# Patient Record
Sex: FEMALE | Race: BLACK OR AFRICAN AMERICAN | Employment: FULL TIME | URBAN - METROPOLITAN AREA
[De-identification: names, ages, dates, MRNs, and addresses within clinical notes are randomized per-mention and may not be internally consistent; named-entity substitution may affect disease eponyms.]

---

## 2022-02-09 ENCOUNTER — TRANSCRIBE ORDER (OUTPATIENT)
Dept: SCHEDULING | Age: 32
End: 2022-02-09

## 2022-02-09 DIAGNOSIS — J32.4 PANSINUSITIS: Primary | ICD-10-CM

## 2022-02-15 ENCOUNTER — HOSPITAL ENCOUNTER (OUTPATIENT)
Dept: CT IMAGING | Age: 32
Discharge: HOME | End: 2022-02-15
Attending: OTOLARYNGOLOGY
Payer: COMMERCIAL

## 2022-02-15 DIAGNOSIS — J32.4 PANSINUSITIS: ICD-10-CM

## 2022-02-15 PROCEDURE — 70486 CT MAXILLOFACIAL W/O DYE: CPT

## 2023-04-06 ENCOUNTER — HOSPITAL ENCOUNTER (OUTPATIENT)
Dept: WOUND CARE | Age: 33
End: 2023-04-06
Payer: COMMERCIAL

## 2023-04-06 PROBLEM — S21.002A OPEN WOUND OF LEFT BREAST WITH COMPLICATION: Status: ACTIVE | Noted: 2023-04-06

## 2023-04-06 PROCEDURE — 11042 DBRDMT SUBQ TIS 1ST 20SQCM/<: CPT

## 2023-04-06 PROCEDURE — 99203 OFFICE O/P NEW LOW 30 MIN: CPT

## 2023-04-06 NOTE — DISCHARGE INSTRUCTIONS
Discharge Instructions/Wound Orders  [place] [address] 1200 [facility], [facility] [address]  Telephone: [phone]    NAME:  Kyree Huggins  YOB: 1990  MEDICAL RECORD NUMBER:  [id number]  DATE:  April 6, 2023  Wound Care Orders:  Left Breast wound - Cleanse with baby shampoo. Rinse and pat dry. Apply iodosorb gel to wound bed. Cover with gauze and tape. Change every other day or more often if needed. Dietary:  [] Diet as tolerated: [] Calorie Diabetic Diet:Low carb and no Sugar [] No Added Salt:[x] Increase Protein: [] Other:Limit the amount of liquid you are drinking and avoid drinking in between meals   Activity:  [x] Activity as tolerated:  [] Patient has no activity restrictions     [] Strict Bedrest: [] Remain off Work:     [] May return to full duty work:                                   [] Return to work with restrictions:   Return Appointment:  [x] Return Appointment: With Dr. Jared Vallejo in 2 [facility])  [] Ordered tests:    Electronically signed Katherin Albert RN on 4/6/2023 at 10:32 AM     Shira Kwan 281: Should you experience any significant changes in your wound(s) or have questions about your wound care, please contact the [address] [place] [address] at [address] [place] [address] 8:00 am - 4:30. If you need help with your wound outside these hours and cannot wait until we are again available, contact your PCP or go to the hospital emergency room. PLEASE NOTE: IF YOU ARE UNABLE TO OBTAIN WOUND SUPPLIES, CONTINUE TO USE THE SUPPLIES YOU HAVE AVAILABLE UNTIL YOU ARE ABLE TO REACH US. IT IS MOST IMPORTANT TO KEEP THE WOUND COVERED AT ALL TIMES.      Physician Signature:_______________________    Date: ___________ Time:  ____________

## 2023-04-06 NOTE — CONSULTS
[address] [facility]     Subjective:      Abdon Medeiros is a 34 y.o. female who presents for evaluation of left upper inner breast wound. Patient's history notable for seeing a dermatologist a few weeks ago for some mild erythema of the breast skin and apparently underwent a biopsy that sounds like a punch biopsy with primary closure that by patient's report was benign. There is no personal or family history of breast cancer other than in an uncle, patient has regular menstrual cycles has never been pregnant, no nipple discharge, has not had any mammograms or ultrasound. Patient reassures me the biopsy was benign. Apparently the wound had been closed primarily by dermatology but subsequently developed an infection and cellulitis, wound was treated with Keflex. Apparently the patient was told to come back in 4 weeks and it sounds like she may have lost daylin in the dermatologist so that she scheduled an appointment in the wound clinic. Patient states the erythema which she had marked several days ago has now regressed and only minimal pain. 04/06/23 1010   Wound Breast Left #1  left breast. 04/06/23   Date First Assessed/Time First Assessed: 04/06/23 1009   Present on Hospital Admission: Yes  Wound Approximate Age at First Assessment (Weeks): 3 weeks  Primary Wound Type: Skin Tear  Location: Breast  Wound Location Orientation: Left  Wound Descripti. .. Wound Image    Wound Etiology Skin Tear   Dressing Status Clean; Old drainage noted   Cleansed Cleansed with saline   Wound Length (cm) 2.3 cm   Wound Width (cm) 1.8 cm   Wound Depth (cm) 0.1 cm   Wound Surface Area (cm^2) 4.14 cm^2   Wound Volume (cm^3) 0.414 cm^3   Wound Assessment [place]/red;Slough   Drainage Amount Small   Drainage Description Serosanguinous   Wound Odor None   Missy-Wound/Incision Assessment Blanchable erythema   Edges Flat/open edges   Wound Thickness Description Partial thickness      Visit Vitals  BP (!) 145/89 (BP 1 Location: Left upper arm, BP Patient Position: At rest;Sitting)   Pulse 86   Temp 97.5 °F (36.4 °C)   Resp 18                     No results found for this or any previous visit (from the past 48 hour(s)). XR Results (maximum last 3): No results found for this or any previous visit. CT Results (maximum last 3): Results from [facility] encounter on 02/15/22    CT SINUSES WO CONT    Impression  1. Clear paranasal sinuses. 2. Mild rightward deviation of the nasal septum. MRI Results (maximum last 3): No results found for this or any previous visit. Nuclear Medicine Results (maximum last 3): No results found for this or any previous visit. US Results (maximum last 3): No results found for this or any previous visit. History reviewed. No pertinent past medical history. History reviewed. No pertinent surgical history. History reviewed. No pertinent family history. Social History     Tobacco Use    Smoking status: Never    Smokeless tobacco: Never   Substance Use Topics    Alcohol use: Never      Prior to Admission medications    Not on File      Not on File    Review of Systems   Constitutional:         Left breast open wound no nipple discharge or other breast masses   All other systems reviewed and are negative. Objective:     Patient Vitals for the past 8 hrs:   BP Temp Pulse Resp   23 1003 (!) 145/89 97.5 °F (36.4 °C) 86 18       Temp (24hrs), Av.5 °F (36.4 °C), Min:97.5 °F (36.4 °C), Max:97.5 °F (36.4 °C)      Physical Exam  Vitals and nursing note reviewed. Exam conducted with a chaperone present (Nursing). Constitutional:       General: She is not in acute distress. Appearance: Normal appearance. She is not ill-appearing. HENT:      Head: Normocephalic. Nose: Nose normal.      Mouth/Throat:      Mouth: Mucous membranes are dry. Eyes:      Conjunctiva/sclera: Conjunctivae normal.   Cardiovascular:      Rate and Rhythm: Normal rate. Pulmonary:      Effort: Pulmonary effort is normal.   Chest:      Comments: Bilateral breast and axilla with no axillary adenopathy, no concerning breast masses no nipple discharge, right breast skin intact with no concerns or erythema or skin changes or nodules, left breast with open wound as described and pictured above, with open wound 1.8 x 2.3 cm. Superficial necrosis evident in the skin and superficial subcutaneous tissue with surrounding skin epithelialization  Abdominal:      General: Abdomen is flat. Palpations: Abdomen is soft. Musculoskeletal:         General: Normal range of motion. Skin:     General: Skin is warm and dry. Neurological:      General: No focal deficit present. Mental Status: She is alert and oriented to person, place, and time. Psychiatric:         Mood and Affect: Mood normal.         Behavior: Behavior normal.         Thought Content: Thought content normal.         Judgment: Judgment normal.       Assessment:     Active Problems:    Open wound of left breast with complication (2/2/2121)        Plan:     Recommended excisional surgical sharp debridement of the necrotic wound tissue noted, benefits risks alternatives reviewed with patient and she concurred,    Procedure: Using sharp circular curette, sharp surgical excisional debridement over 1.8 cm x 2.3 cm area down to granulating healthy superficial subcutaneous tissue for debridement of the skin and superficial subcutaneous tissue necrosis back to healthy bleeding tissue with a skin bridge between the 2 areas. Patient tolerated procedure well with no discomfort. She had lidocaine jelly applied to the area previously. Wound care with will be with Iodosorb gauze or pad dressing she can insert in the bra to hopefully avoid any adhesive tape shearing or dermatitis, apply twice a day after shower cleansing with soap and water.   Wound may take several weeks to couple months to entirely epithelialize, no evidence of deep-seated abscess or cellulitis,    Mammograms bilaterally at age 41 or sooner if recommended by PCP, no need for concern for malignancy at this point. Follow-up in 2 weeks, sooner if needed and if healing well patient can cancel appointment if she is satisfied.     FACE TO FACE time including review of any indicated imaging, discussion with patient, and other providers, exam and discussion with patient:   37          Minutes    Signed By: Aruna Newman MD   [facility]    April 6, 2023

## 2023-04-06 NOTE — WOUND CARE
04/06/23 1010   Wound Breast Left #1  left breast. 04/06/23   Date First Assessed/Time First Assessed: 04/06/23 1009   Present on Hospital Admission: Yes  Wound Approximate Age at First Assessment (Weeks): 3 weeks  Primary Wound Type: Skin Tear  Location: Breast  Wound Location Orientation: Left  Wound Descripti. .. Wound Image    Wound Etiology Skin Tear   Dressing Status Clean; Old drainage noted   Cleansed Cleansed with saline   Wound Length (cm) 2.3 cm   Wound Width (cm) 1.8 cm   Wound Depth (cm) 0.1 cm   Wound Surface Area (cm^2) 4.14 cm^2   Wound Volume (cm^3) 0.414 cm^3   Wound Assessment [place]/red;Slough   Drainage Amount Small   Drainage Description Serosanguinous   Wound Odor None   Missy-Wound/Incision Assessment Blanchable erythema   Edges Flat/open edges   Wound Thickness Description Partial thickness     Visit Vitals  BP (!) 145/89 (BP 1 Location: Left upper arm, BP Patient Position: At rest;Sitting)   Pulse 86   Temp 97.5 °F (36.4 °C)   Resp 18

## 2023-04-20 ENCOUNTER — HOSPITAL ENCOUNTER (OUTPATIENT)
Dept: WOUND CARE | Age: 33
Discharge: HOME | End: 2023-04-20
Payer: COMMERCIAL

## 2023-04-20 VITALS
DIASTOLIC BLOOD PRESSURE: 82 MMHG | TEMPERATURE: 97.9 F | HEART RATE: 60 BPM | SYSTOLIC BLOOD PRESSURE: 134 MMHG | RESPIRATION RATE: 18 BRPM

## 2023-04-20 DIAGNOSIS — S21.002D OPEN WOUND OF LEFT BREAST WITH COMPLICATION, SUBSEQUENT ENCOUNTER: Primary | ICD-10-CM

## 2023-04-20 DIAGNOSIS — S21.002A: ICD-10-CM

## 2023-04-20 PROCEDURE — 11042 DBRDMT SUBQ TIS 1ST 20SQCM/<: CPT

## 2023-04-20 RX ORDER — BACITRACIN ZINC AND POLYMYXIN B SULFATE 500; 1000 [USP'U]/G; [USP'U]/G
OINTMENT TOPICAL ONCE
OUTPATIENT
Start: 2023-04-20 | End: 2023-04-20

## 2023-04-20 RX ORDER — MUPIROCIN 20 MG/G
OINTMENT TOPICAL ONCE
OUTPATIENT
Start: 2023-04-20 | End: 2023-04-20

## 2023-04-20 RX ORDER — BETAMETHASONE DIPROPIONATE 0.5 MG/G
OINTMENT TOPICAL ONCE
OUTPATIENT
Start: 2023-04-20 | End: 2023-04-20

## 2023-04-20 RX ORDER — GENTAMICIN SULFATE 1 MG/G
OINTMENT TOPICAL ONCE
OUTPATIENT
Start: 2023-04-20 | End: 2023-04-20

## 2023-04-20 RX ORDER — BACITRACIN 500 [USP'U]/G
OINTMENT TOPICAL ONCE
OUTPATIENT
Start: 2023-04-20 | End: 2023-04-20

## 2023-04-20 RX ORDER — LIDOCAINE 40 MG/G
CREAM TOPICAL ONCE
OUTPATIENT
Start: 2023-04-20 | End: 2023-04-20

## 2023-04-20 RX ORDER — SILVER SULFADIAZINE 10 G/1000G
CREAM TOPICAL ONCE
OUTPATIENT
Start: 2023-04-20 | End: 2023-04-20

## 2023-04-20 RX ORDER — CLOBETASOL PROPIONATE 0.5 MG/G
OINTMENT TOPICAL ONCE
OUTPATIENT
Start: 2023-04-20 | End: 2023-04-20

## 2023-04-20 RX ORDER — LIDOCAINE HYDROCHLORIDE 20 MG/ML
JELLY TOPICAL ONCE
OUTPATIENT
Start: 2023-04-20 | End: 2023-04-20

## 2023-04-20 RX ORDER — LIDOCAINE 50 MG/G
OINTMENT TOPICAL ONCE
OUTPATIENT
Start: 2023-04-20 | End: 2023-04-20

## 2023-04-20 RX ORDER — LIDOCAINE HYDROCHLORIDE 40 MG/ML
SOLUTION TOPICAL ONCE
OUTPATIENT
Start: 2023-04-20 | End: 2023-04-20

## 2023-04-20 RX ORDER — TRIAMCINOLONE ACETONIDE 1 MG/G
OINTMENT TOPICAL ONCE
OUTPATIENT
Start: 2023-04-20 | End: 2023-04-20

## 2023-04-20 NOTE — WOUND CARE
04/20/23 1024   Anesthetic   Anesthetic 4% Lidocaine Liquid Topical   Wound Breast Left #1  left breast. 04/06/23   Date First Assessed/Time First Assessed: 04/06/23 1009   Present on Hospital Admission: Yes  Wound Approximate Age at First Assessment (Weeks): 3 weeks  Primary Wound Type: Skin Tear  Location: Breast  Wound Location Orientation: Left  Wound Descripti. ..    Wound Image    Cleansed Cleansed with saline   Wound Length (cm) 2 cm   Wound Width (cm) 1.6 cm   Wound Depth (cm) 0 cm   Wound Surface Area (cm^2) 3.2 cm^2   Change in Wound Size % 22.71   Wound Volume (cm^3) 0 cm^3   Wound Healing % 100   Wound Assessment Dry;Slough   Drainage Amount Small   Drainage Description Serosanguinous   Wound Odor None   Missy-Wound/Incision Assessment Intact   Edges Attached edges     Visit Vitals  /82 (BP 1 Location: Left upper arm, BP Patient Position: Reclining)   Pulse 60   Temp 97.9 °F (36.6 °C)   Resp 18

## 2023-04-20 NOTE — DISCHARGE INSTRUCTIONS
Discharge Instructions/Wound Orders  [place] [address] 1200 [facility], [address]  Telephone: [phone]    NAME:  Anurag Navarrete  YOB: 1990  MEDICAL RECORD NUMBER:  [id number]  DATE:  April 20, 2023    Wound Care Orders:  Left Breast wound - Cleanse with baby shampoo. Rinse and pat dry. Apply Xeroform. Cover with gauze and tape. Change every other day or more often if needed for drainage. Dietary:  [x] Increase Protein:    Activity:  [x] Activity as tolerated    Return Appointment:  [x] Return Appointment: With Dr. Katy Beltran in 1 [facility])  [] Ordered tests:     [address] Information: Should you experience any significant changes in your wound(s) or have questions about your wound care, please contact the [address] [place] [address] at [address] 8:00 am - 4:30. If you need help with your wound outside these hours and cannot wait until we are again available, contact your PCP or go to the hospital emergency room. PLEASE NOTE: IF YOU ARE UNABLE TO OBTAIN WOUND SUPPLIES, CONTINUE TO USE THE SUPPLIES YOU HAVE AVAILABLE UNTIL YOU ARE ABLE TO REACH US. IT IS MOST IMPORTANT TO KEEP THE WOUND COVERED AT ALL TIMES.      Physician Signature:_______________________    Date: ___________ Time:  ____________ 5

## 2023-04-20 NOTE — WOUND CARE
04/20/23 1155   Wound Breast Left #1  left breast. 04/06/23   Date First Assessed/Time First Assessed: 04/06/23 1009   Present on Hospital Admission: Yes  Wound Approximate Age at First Assessment (Weeks): 3 weeks  Primary Wound Type: Skin Tear  Location: Breast  Wound Location Orientation: Left  Wound Descripti. ..    Dressing Status New dressing applied   Dressing/Treatment   (xeroform, gauze, secured with tape.)

## 2023-04-20 NOTE — PROGRESS NOTES
[address]   Progress Note and Procedure Note     [facility] RECORD NUMBER:  [id number]  AGE: 34 y.o. RACE BLACK/  GENDER: female  : 1990  EPISODE DATE:  2023    Subjective:     Chief Complaint   Patient presents for wound check    Wound Check     Left breast         HISTORY of PRESENT ILLNESS HPI    Liz Garcia is a 34 y.o. female with a left breast skin wound that failed to heal following a biopsy of a rash. Pathology reportedly nonmalignant but the final report has been requested. She was seen by Dr Valeriano Guillaume  2 weeks ago and was treated with  debridement of the necrotic wound followed by wound care instructions. She returns for follow up. She reports no pain today. Minor discharge. Denies any fever or bleeding. History of Wound Context: see above  Wound/Ulcer Pain Timing/Severity: none  Quality of pain: none  Severity:  0 / 10   Modifying Factors: None  Associated Signs/Symptoms: decreased drainage    Ulcer Identification:  Ulcer Type: non-healing surgical    Contributing Factors: none  none  Wound: see wound description above and image below         PAST MEDICAL HISTORY    No past medical history of diabetes. Does not smoke. PAST SURGICAL HISTORY    No past surgical history on file. FAMILY HISTORY    No family history of breast cancer. SOCIAL HISTORY    Social History     Tobacco Use    Smoking status: Never    Smokeless tobacco: Never   Substance Use Topics    Alcohol use: Never    Drug use: Never       ALLERGIES    Not on File    MEDICATIONS    No current outpatient medications on file prior to encounter. No current facility-administered medications on file prior to encounter. REVIEW OF SYSTEMS    Review of systems not obtained due to patient factors.     Objective:     Visit Vitals  /82 (BP 1 Location: Left upper arm, BP Patient Position: Reclining)   Pulse 60   Temp 97.9 °F (36.6 °C)   Resp 18       Wt Readings from Last 3 Encounters:   No data found for Wt       PHYSICAL EXAM    Constitutional: negative  Respiratory: negative  Integument/breast: Left breast wound is same in size but with a superficial ulcer and overlying \\moist eschar . No odor. Minimal if any discharge  Neurological: negative. A&O x 4. In good spirits. Conversant  Behavioral/Psych: negative    Assessment:      Problem List Items Addressed This Visit       Open wound of left breast with complication - Primary    Relevant Medications    lidocaine (ALOCANE) 4 % topical gel (Completed)    Other Relevant Orders    INITIATE OUTPATIENT WOUND CARE PROTOCOL       Wound Breast Left #1  left breast. 04/06/23 (Active)   Wound Image   04/20/23 1024   Wound Etiology Skin Tear 04/06/23 1010   Dressing Status New dressing applied 04/20/23 1155   Cleansed Cleansed with saline 04/20/23 1024   Wound Length (cm) 2 cm 04/20/23 1024   Wound Width (cm) 1.6 cm 04/20/23 1024   Wound Depth (cm) 0 cm 04/20/23 1024   Wound Surface Area (cm^2) 3.2 cm^2 04/20/23 1024   Change in Wound Size % 22.71 04/20/23 1024   Wound Volume (cm^3) 0 cm^3 04/20/23 1024   Wound Healing % 100 04/20/23 1024   Post-Procedure Length (cm) 2 cm 04/20/23 1142   Post-Procedure Width (cm) 1.6 cm 04/20/23 1142   Post-Procedure Depth (cm) 0.1 cm 04/20/23 1142   Post-Procedure Surface Area (cm^2) 3.2 cm^2 04/20/23 1142   Post-Procedure Volume (cm^3) 0.32 cm^3 04/20/23 1142   Wound Assessment Dry;Slough 04/20/23 1024   Drainage Amount Small 04/20/23 1024   Drainage Description Serosanguinous 04/20/23 1024   Wound Odor None 04/20/23 1024   Missy-Wound/Incision Assessment Intact 04/20/23 1024   Edges Attached edges 04/20/23 1024   Wound Thickness Description Partial thickness 04/06/23 1010   Number of days: 14     Procedure:  recommended debridement of the wound which the patient agreed and consent to. Toptical xylocaine gel was applied to the wound.   Using a curettage, the eschar was removed along with some slough along the edges, leaving a clean healthy pink base. None to minimal bleeding. Patient tolerate well. She experienced no pain during process. Plan:     34 y.o. female with a left breast skin wound that failed to heal following a biopsy of a rash. Patient is making progress in healing. Debridement of some sloug\h, biofilm and eschar will promote better healing. This was provided today. Wound care management continues with xeroform and dressing. To return to our office in 2 weeks or sooner if need. Treatment Note please see attached Discharge Instructions    Written patient dismissal instructions given to patient or POA.          Electronically signed by Juana Norton MD on 4/20/2023 at 2:49 PM

## 2023-04-27 ENCOUNTER — HOSPITAL ENCOUNTER (OUTPATIENT)
Dept: WOUND CARE | Age: 33
Discharge: HOME | End: 2023-04-27
Payer: COMMERCIAL

## 2023-04-27 VITALS
RESPIRATION RATE: 17 BRPM | TEMPERATURE: 97.9 F | HEART RATE: 80 BPM | DIASTOLIC BLOOD PRESSURE: 76 MMHG | SYSTOLIC BLOOD PRESSURE: 122 MMHG

## 2023-04-27 DIAGNOSIS — S21.002A: Primary | ICD-10-CM

## 2023-04-27 PROCEDURE — 11042 DBRDMT SUBQ TIS 1ST 20SQCM/<: CPT

## 2023-04-27 RX ORDER — MUPIROCIN 20 MG/G
OINTMENT TOPICAL ONCE
OUTPATIENT
Start: 2023-04-27 | End: 2023-04-27

## 2023-04-27 RX ORDER — BETAMETHASONE DIPROPIONATE 0.5 MG/G
OINTMENT TOPICAL ONCE
OUTPATIENT
Start: 2023-04-27 | End: 2023-04-27

## 2023-04-27 RX ORDER — SILVER SULFADIAZINE 10 G/1000G
CREAM TOPICAL ONCE
OUTPATIENT
Start: 2023-04-27 | End: 2023-04-27

## 2023-04-27 RX ORDER — GENTAMICIN SULFATE 1 MG/G
OINTMENT TOPICAL ONCE
OUTPATIENT
Start: 2023-04-27 | End: 2023-04-27

## 2023-04-27 RX ORDER — TRIAMCINOLONE ACETONIDE 1 MG/G
OINTMENT TOPICAL ONCE
OUTPATIENT
Start: 2023-04-27 | End: 2023-04-27

## 2023-04-27 RX ORDER — LIDOCAINE HYDROCHLORIDE 20 MG/ML
JELLY TOPICAL ONCE
OUTPATIENT
Start: 2023-04-27 | End: 2023-04-27

## 2023-04-27 RX ORDER — CLOBETASOL PROPIONATE 0.5 MG/G
OINTMENT TOPICAL ONCE
OUTPATIENT
Start: 2023-04-27 | End: 2023-04-27

## 2023-04-27 RX ORDER — BACITRACIN 500 [USP'U]/G
OINTMENT TOPICAL ONCE
OUTPATIENT
Start: 2023-04-27 | End: 2023-04-27

## 2023-04-27 RX ORDER — LIDOCAINE 50 MG/G
OINTMENT TOPICAL ONCE
OUTPATIENT
Start: 2023-04-27 | End: 2023-04-27

## 2023-04-27 RX ORDER — LIDOCAINE HYDROCHLORIDE 40 MG/ML
SOLUTION TOPICAL ONCE
OUTPATIENT
Start: 2023-04-27 | End: 2023-04-27

## 2023-04-27 RX ORDER — LIDOCAINE 40 MG/G
CREAM TOPICAL ONCE
OUTPATIENT
Start: 2023-04-27 | End: 2023-04-27

## 2023-04-27 RX ORDER — BACITRACIN ZINC AND POLYMYXIN B SULFATE 500; 1000 [USP'U]/G; [USP'U]/G
OINTMENT TOPICAL ONCE
OUTPATIENT
Start: 2023-04-27 | End: 2023-04-27

## 2023-04-27 NOTE — PROGRESS NOTES
[address]   Progress Note and Procedure Note     [facility] RECORD NUMBER:  [id number]  AGE: 34 y.o. RACE BLACK/  GENDER: female  : 1990  EPISODE DATE:  2023    Subjective:     Chief Complaint   Patient presents for follow up of the left breast skin wound    Follow-up         HISTORY of PRESENT ILLNESS HPI    Glenis Salgado is a 34 y.o. female who presents today for left breast skin wound that failed to heal following biopsy. Lopez Amin History of Wound Context: left breast wound that failed to heal following biopsy was debrided followed by wound care instructions. Today the patient reports mild drainage of yellowish discharge. Reports no pain, bleeding or swelling. Has no fever. Wound/Ulcer Pain Timing/Severity: none  Quality of pain: none  Severity:  0 / 10   Modifying Factors: None  Associated Signs/Symptoms: minimal discharge    Ulcer Identification:  Ulcer Type: non-healing/non-surgical    Contributing Factors: none    Wound: see above description and picture         PAST MEDICAL HISTORY    History reviewed. No pertinent past medical history. PAST SURGICAL HISTORY    History reviewed. No pertinent surgical history. FAMILY HISTORY    History reviewed. No pertinent family history. SOCIAL HISTORY    Social History     Tobacco Use    Smoking status: Never    Smokeless tobacco: Never   Substance Use Topics    Alcohol use: Never    Drug use: Never       ALLERGIES    Not on File    MEDICATIONS    No current outpatient medications on file prior to encounter. No current facility-administered medications on file prior to encounter. REVIEW OF SYSTEMS    A comprehensive review of systems was negative except for: above. See HPI.  Able to work without any problems    Objective:     Visit Vitals  /76 (BP 1 Location: Left upper arm)   Pulse 80   Temp 97.9 °F (36.6 °C)   Resp 17       Wt Readings from Last 3 Encounters:   No data found for Wt       PHYSICAL EXAM    Constitutional: WDWN female in NAD. Pleasant. Eyes: negative  Respiratory: negative  Integument/breast: Left breast wound appears smaller with thin biofilm over base. Small amount of serosanguinous discharge. No surround edema. Edges are well defined . No active bleeding. Neurological: negative. A&O x 4  Behavioral/Psych: negative    Assessment:      Problem List Items Addressed This Visit       Open wound of left breast with complication - Primary    Relevant Orders    INITIATE OUTPATIENT WOUND CARE PROTOCOL       Wound Breast Left #1  left breast. 04/06/23 (Active)   Wound Image   04/27/23 0849   Wound Etiology Traumatic 04/27/23 0849   Dressing Status Intact; Old drainage noted 04/27/23 0849   Cleansed Cleansed with saline 04/27/23 0849   Wound Length (cm) 1.2 cm 04/27/23 0849   Wound Width (cm) 1.1 cm 04/27/23 0849   Wound Depth (cm) 0.2 cm 04/27/23 0849   Wound Surface Area (cm^2) 1.32 cm^2 04/27/23 0849   Change in Wound Size % 68.12 04/27/23 0849   Wound Volume (cm^3) 0.264 cm^3 04/27/23 0849   Wound Healing % 36 04/27/23 0849   Post-Procedure Length (cm) 2 cm 04/20/23 1142   Post-Procedure Width (cm) 1.6 cm 04/20/23 1142   Post-Procedure Depth (cm) 0.1 cm 04/20/23 1142   Post-Procedure Surface Area (cm^2) 3.2 cm^2 04/20/23 1142   Post-Procedure Volume (cm^3) 0.32 cm^3 04/20/23 1142   Wound Assessment Pink/red;Slough 04/27/23 0849   Drainage Amount Small 04/27/23 0849   Drainage Description Serosanguinous 04/27/23 0849   Wound Odor None 04/27/23 0849   Missy-Wound/Incision Assessment Intact 04/27/23 0849   Edges Defined edges 04/27/23 0849   Wound Thickness Description Partial thickness 04/27/23 0849   Number of days: 21     Procedure:  Left breast wound - recommended debridement of the wound which the patient consent to. Toptical xylocaine gel was applied to the wound. Using a curettage, the biofilm was debrided from the base. A tiny 2mm eschar was removed.  along with some slough along the edges, leaving a clean healthy pink base. Minimal bleeding. Patient tolerate well with minimal tingling discomfort. Plan:   Ms Iglesia Ray is making some progress. Wound is still opened but reduced in size. Debridement offered and performed with the patient's consent. This was cleansed and wound management discussed. Collagen added to her care this week. Follow up in one week. Treatment Note please see attached Discharge Instructions    Written patient dismissal instructions given to patient. Discharge in stable condition.          Electronically signed by Epi Goodman MD on 4/27/2023 at 10:56 AM

## 2023-04-27 NOTE — WOUND CARE
04/27/23 0849   Wound Breast Left #1  left breast. 04/06/23   Date First Assessed/Time First Assessed: 04/06/23 1009   Present on Hospital Admission: Yes  Wound Approximate Age at First Assessment (Weeks): 3 weeks  Primary Wound Type: Skin Tear  Location: Breast  Wound Location Orientation: Left  Wound Descripti. .. Wound Image    Wound Etiology Traumatic   Dressing Status Intact; Old drainage noted   Cleansed Cleansed with saline   Wound Length (cm) 1.2 cm   Wound Width (cm) 1.1 cm   Wound Depth (cm) 0.2 cm   Wound Surface Area (cm^2) 1.32 cm^2   Change in Wound Size % 68.12   Wound Volume (cm^3) 0.264 cm^3   Wound Healing % 36   Wound Assessment [place]/red;Slough   Drainage Amount Small   Drainage Description Serosanguinous   Wound Odor None   Missy-Wound/Incision Assessment Intact   Edges Defined edges   Wound Thickness Description Partial thickness   Pain 1   Pain Scale 1 Numeric (0 - 10)   Pain Intensity 1 0     Visit Vitals  /76 (BP 1 Location: Left upper arm)   Pulse 80   Temp 97.9 °F (36.6 °C)   Resp 17

## 2023-04-27 NOTE — DISCHARGE INSTRUCTIONS
Discharge Instructions/Wound Orders  [place] [address] 1200 [facility], [facility] [address]  Telephone: [phone]    NAME:  Dayan Cheek  YOB: 1990  MEDICAL RECORD NUMBER:  [id number]  DATE:  April 27, 2023  Wound Care Orders:  Left Breast wound - Cleanse with baby shampoo. Rinse and pat dry. Apply ventura lightly moistened with saline to wound bed. Cover with gauze and tape. Change every other day or more often if needed. Activity:  [x] Activity as tolerated:     [] Remain off Work:       [] May return to full duty work:                                     [] Return to work with restrictions:  Dietary:  [x] Diet as tolerated      [] Diabetic Diet            [x] Increase Protein: examples (Meat, cheese, eggs, greek yogurt, fish, nuts)          [] [address]  [] Other:  Return Appointment:  [x] Return Appointment: With Dr. Christopher Nguyen in 1 week. [] Ordered tests:    Electronically signed Svetlana Stevenson RN on 4/27/2023 at 9:34 [address]: Should you experience any significant changes in your wound(s) or have questions about your wound care, please contact the [address] [place] [address] at [address] [place] [address] 8:00 am - 4:30. If you need help with your wound outside these hours and cannot wait until we are again available, contact your PCP or go to the hospital emergency room. PLEASE NOTE: IF YOU ARE UNABLE TO OBTAIN WOUND SUPPLIES, CONTINUE TO USE THE SUPPLIES YOU HAVE AVAILABLE UNTIL YOU ARE ABLE TO REACH US. IT IS MOST IMPORTANT TO KEEP THE WOUND COVERED AT ALL TIMES.      Physician Signature:_______________________    Date: ___________ Time:  ____________

## 2023-05-04 ENCOUNTER — HOSPITAL ENCOUNTER (OUTPATIENT)
Dept: WOUND CARE | Age: 33
Discharge: HOME | End: 2023-05-04
Payer: COMMERCIAL

## 2023-05-04 VITALS
RESPIRATION RATE: 18 BRPM | SYSTOLIC BLOOD PRESSURE: 123 MMHG | TEMPERATURE: 97.6 F | DIASTOLIC BLOOD PRESSURE: 86 MMHG | HEART RATE: 75 BPM

## 2023-05-04 PROCEDURE — 11042 DBRDMT SUBQ TIS 1ST 20SQCM/<: CPT

## 2023-05-11 ENCOUNTER — HOSPITAL ENCOUNTER (OUTPATIENT)
Facility: HOSPITAL | Age: 33
Discharge: HOME | End: 2023-05-11
Payer: COMMERCIAL

## 2023-05-11 VITALS
SYSTOLIC BLOOD PRESSURE: 132 MMHG | RESPIRATION RATE: 17 BRPM | DIASTOLIC BLOOD PRESSURE: 74 MMHG | TEMPERATURE: 97 F | HEART RATE: 70 BPM

## 2023-05-11 PROCEDURE — 99212 OFFICE O/P EST SF 10 MIN: CPT

## 2023-05-11 RX ORDER — FEXOFENADINE HCL 180 MG/1
180 TABLET ORAL DAILY
COMMUNITY

## 2023-05-11 RX ORDER — TRIAMCINOLONE ACETONIDE 55 UG/1
2 SPRAY, METERED NASAL DAILY
COMMUNITY

## 2023-05-11 ASSESSMENT — PAIN SCALES - GENERAL: PAINLEVEL_OUTOF10: 0

## 2023-05-11 NOTE — PROGRESS NOTES
[address]   Progress Note and Procedure Note   NO Procedure      [facility] RECORD NUMBER:  [id number]  AGE: 34 y.o. RACE Black /   GENDER: female  : 1990  EPISODE DATE:  2023    Subjective:     Chief Complaint   Patient presents for follow up of her left breast skin wound    Follow-up         HISTORY of PRESENT ILLNESS [facility]    Nakul Alcala is a 34 y.o. female who presents today for wound/ulcer evaluation. History of Wound Context: Ms Megan Polk returns for follow up having been on Sindy. Wound is without discharge, pain, redness, bleeding or swelling. She is having no problems with it. Wound/Ulcer Pain Timing/Severity: none  Quality of pain: sharp  Severity:  0 / 10   Modifying Factors: None  Associated Signs/Symptoms: none    Ulcer Identification:  Ulcer Type: non-healing surgical    Contributing Factors: none    Wound: wound has healed over with no areas of discharge, bleed or slough. Edges with hyperkeratotic scaring. No pain. No edema or erythema. PAST MEDICAL HISTORY    Past Medical History:   Diagnosis Date    Vestibular migraine         PAST SURGICAL HISTORY    History reviewed. No pertinent surgical history. FAMILY HISTORY    History reviewed. No pertinent family history. SOCIAL HISTORY    Social History     Tobacco Use    Smoking status: Never    Smokeless tobacco: Never   Substance Use Topics    Alcohol use: Never    Drug use: Never       ALLERGIES    No Known Allergies    MEDICATIONS    Current Outpatient Medications on File Prior to Encounter   Medication Sig Dispense Refill    fexofenadine (ALLEGRA ALLERGY) 180 MG tablet Take 1 tablet by mouth daily      triamcinolone (NASACORT ALLERGY 24HR) 55 MCG/ACT nasal inhaler 2 sprays by Each Nostril route daily       No current facility-administered medications on file prior to encounter. REVIEW OF SYSTEMS    Pertinent items are noted in HPI.     Objective:     BP

## 2023-05-11 NOTE — FLOWSHEET NOTE
05/11/23 0852   Wound 05/11/23 Breast Left   Date First Assessed: 05/11/23   Location: Breast  Wound Location Orientation: Left   Wound Image    Wound Etiology Traumatic   Dressing Status Intact;Dry   Wound Cleansed Cleansed with saline   Wound Length (cm) 0.1 cm   Wound Width (cm) 0.1 cm   Wound Depth (cm) 0.1 cm   Wound Surface Area (cm^2) 0.01 cm^2   Wound Volume (cm^3) 0.001 cm^3   Wound Assessment Pink/red   Drainage Amount None   Odor None   Bernadine-wound Assessment Intact   Margins Defined edges   Wound Thickness Description not for Pressure Injury Full thickness   Pain Assessment   Pain Assessment 0-10   Pain Level 0     /74   Pulse 70   Temp 97 °F (36.1 °C) (Temporal)   Resp 17 

## 2023-05-11 NOTE — DISCHARGE INSTRUCTIONS
Discharge Instructions/Wound Orders  [place] [address] [facility], [address] [place] [address] Avenue  Telephone: [phone]    NAME:  Ulises Samuel  YOB: 1990  MEDICAL RECORD NUMBER:  [id number]  DATE:  May 11, 2023  Wound Care Orders:  Use mederma advanced scar gel or silicone scar patches to help with scarring. Return Appointment:  [x] Return Appointment: No follow up needed. You are healed, congratulations! [] Ordered tests:    Electronically signed Bella Rios RN on 5/11/2023 at [facility]: Should you experience any significant changes in your wound(s) or have questions about your wound care, please contact the [address] [place] [address] at [address] [place] [address] 8:00 am - 4:30. If you need help with your wound outside these hours and cannot wait until we are again available, contact your PCP or go to the hospital emergency room. PLEASE NOTE: IF YOU ARE UNABLE TO OBTAIN WOUND SUPPLIES, CONTINUE TO USE THE SUPPLIES YOU HAVE AVAILABLE UNTIL YOU ARE ABLE TO REACH US. IT IS MOST IMPORTANT TO KEEP THE WOUND COVERED AT ALL TIMES.      Physician Signature:_______________________    Date: ___________ Time:  ____________

## 2023-05-11 NOTE — FLOWSHEET NOTE
05/11/23 0925   Wound 05/11/23 Breast Left   Date First Assessed: 05/11/23   Location: Breast  Wound Location Orientation: Left   Dressing Status Other (Comment)  (area healed- no dressing ordered.)

## 2023-06-22 ENCOUNTER — HOSPITAL ENCOUNTER (OUTPATIENT)
Facility: HOSPITAL | Age: 33
Discharge: HOME | End: 2023-06-22
Payer: COMMERCIAL

## 2023-06-22 VITALS
HEART RATE: 67 BPM | SYSTOLIC BLOOD PRESSURE: 118 MMHG | DIASTOLIC BLOOD PRESSURE: 79 MMHG | BODY MASS INDEX: 38.32 KG/M2 | HEIGHT: 65 IN | TEMPERATURE: 97.7 F | WEIGHT: 230 LBS | RESPIRATION RATE: 16 BRPM

## 2023-06-22 PROCEDURE — 99212 OFFICE O/P EST SF 10 MIN: CPT

## 2023-06-22 NOTE — FLOWSHEET NOTE
06/22/23 1014   Wound 06/22/23 Breast Left   Date First Assessed/Time First Assessed: 06/22/23 1014   Present on Hospital Admission: Yes  Location: Breast  Wound Location Orientation: Left   Wound Image    Dressing Status Clean;Dry; Intact   Wound Cleansed Irrigated with saline   Wound Length (cm) 0.1 cm   Wound Width (cm) 0.1 cm   Wound Depth (cm) 0.1 cm   Wound Surface Area (cm^2) 0.01 cm^2   Wound Volume (cm^3) 0.001 cm^3   Wound Assessment Epithelialization   Drainage Amount None   Odor None   Bernadine-wound Assessment Fragile   Pain Assessment   Pain Assessment None - Denies Pain     /79   Pulse 67   Temp 97.7 °F (36.5 °C) (Temporal)   Resp 16   Ht 1.651 m (5' 5\")   Wt 104.3 kg (230 lb)   BMI 38.27 kg/m²

## 2023-06-22 NOTE — DISCHARGE INSTRUCTIONS
Discharge Instructions/Wound Orders  [place] [address] 1200 [facility], [address]  Telephone: [phone]    NAME:  Talat Siddiqui  YOB: 1990  MEDICAL RECORD NUMBER:  [id number]  DATE:  June 22, 2023  Wound Care Orders:  Moisturize daily. Activity:  [x] Activity as tolerated:     [] Remain off Work:       [] May return to full duty work:                                     [] Return to work with restrictions:  Dietary:  [x] Diet as tolerated      [] Diabetic Diet            [] Increase Protein: examples (Meat, cheese, eggs, greek yogurt, fish, nuts)          [] [address]  Return Appointment:  [x] Return Appointment: No follow up needed. [] Ordered tests:    Electronically signed Traci García RN on 6/22/2023 at 10:39 AM     [address]: Should you experience any significant changes in your wound(s) or have questions about your wound care, please contact the [address] [place] [address] at [address] [place] [address] 8:00 am - 4:30. If you need help with your wound outside these hours and cannot wait until we are again available, contact your PCP or go to the hospital emergency room. PLEASE NOTE: IF YOU ARE UNABLE TO OBTAIN WOUND SUPPLIES, CONTINUE TO USE THE SUPPLIES YOU HAVE AVAILABLE UNTIL YOU ARE ABLE TO REACH US. IT IS MOST IMPORTANT TO KEEP THE WOUND COVERED AT ALL TIMES.      Physician Signature:_______________________    Date: ___________ Time:  ____________

## 2023-06-22 NOTE — PROGRESS NOTES
[facility]   Medical Staff Progress Note     [facility] RECORD NUMBER:  [id number]  AGE: 34 y.o. GENDER: female  : 1990  EPISODE DATE:  2023    Chief complaint and reason for visit:     Chief Complaint   Patient presents for a wound check involving the left breast following a skin biopsy    Wound Check      Patient presenting for follow up evaluation of wound(s) per chief complaint. Subjective:  Ms Wilder Alicea returns today with concerns of wound recurring in the left breast.  Patient was seen in [address] [place] [address] in  for a wound that occurred following a biopsy of a rash in the left breast.  This was treated and responded well to debridement and Sindy. She was discharged from the [address] [place] [address] on 2023. Since then, the patient states doing well until the other day she noted at [facility] spot\" in the breast and became concerned the wound is returning. She has no discharge, bleeding or pain associated with this. She recalls no trauma to this site. Has no fever. Medical Decision Making:     Non healing ulcer in the left breast following biopsy      Wounds and Treatment Plan:  Left breast ulcer - Wound is healed well with a light thin eschar over the wound. The Black spot noted by the patient appears to be black ink that is part of the surround dry dermis. A minor scraping lifted the black ink /dried skin as well as the thin eschar over the well healed wound. Patient was reassured there is no signs of an open wound reoccurring . There was no signs of infection. No further wound care needed. Other associated diagnoses or problems addressed:  na    Pertinent imaging reviewed including independent interpretation include:  na    Pertinent labs reviewed. Medical records and review of external note (s) from other providers done as well.     New lab or imaging orders

## 2023-10-14 ENCOUNTER — HOSPITAL ENCOUNTER (EMERGENCY)
Facility: HOSPITAL | Age: 33
Discharge: HOME | End: 2023-10-14
Attending: EMERGENCY MEDICINE
Payer: COMMERCIAL

## 2023-10-14 ENCOUNTER — APPOINTMENT (OUTPATIENT)
Facility: HOSPITAL | Age: 33
End: 2023-10-14
Payer: COMMERCIAL

## 2023-10-14 VITALS
HEART RATE: 72 BPM | TEMPERATURE: 97.6 F | SYSTOLIC BLOOD PRESSURE: 133 MMHG | RESPIRATION RATE: 16 BRPM | HEIGHT: 65 IN | OXYGEN SATURATION: 99 % | WEIGHT: 210.98 LBS | DIASTOLIC BLOOD PRESSURE: 82 MMHG | BODY MASS INDEX: 35.15 KG/M2

## 2023-10-14 DIAGNOSIS — R10.13 ABDOMINAL PAIN, EPIGASTRIC: Primary | ICD-10-CM

## 2023-10-14 DIAGNOSIS — R11.0 NAUSEA: ICD-10-CM

## 2023-10-14 LAB
ALBUMIN SERPL-MCNC: 3.3 G/DL (ref 3.5–5)
ALBUMIN/GLOB SERPL: 0.9 (ref 1.1–2.2)
ALP SERPL-CCNC: 52 U/L (ref 45–117)
ALT SERPL-CCNC: 16 U/L (ref 12–78)
ANION GAP SERPL CALC-SCNC: 6 MMOL/L (ref 5–15)
APPEARANCE UR: CLEAR
AST SERPL-CCNC: 7 U/L (ref 15–37)
BACTERIA URNS QL MICRO: ABNORMAL /HPF
BASOPHILS # BLD: 0 K/UL (ref 0–0.1)
BASOPHILS NFR BLD: 1 % (ref 0–1)
BILIRUB SERPL-MCNC: 0.3 MG/DL (ref 0.2–1)
BILIRUB UR QL: NEGATIVE
BUN SERPL-MCNC: 11 MG/DL (ref 6–20)
BUN/CREAT SERPL: 9 (ref 12–20)
CALCIUM SERPL-MCNC: 8.9 MG/DL (ref 8.5–10.1)
CHLORIDE SERPL-SCNC: 103 MMOL/L (ref 97–108)
CO2 SERPL-SCNC: 31 MMOL/L (ref 21–32)
COLOR UR: ABNORMAL
CREAT SERPL-MCNC: 1.16 MG/DL (ref 0.55–1.02)
DIFFERENTIAL METHOD BLD: NORMAL
EOSINOPHIL # BLD: 0.1 K/UL (ref 0–0.4)
EOSINOPHIL NFR BLD: 2 % (ref 0–7)
EPITH CASTS URNS QL MICRO: ABNORMAL /LPF
ERYTHROCYTE [DISTWIDTH] IN BLOOD BY AUTOMATED COUNT: 13.4 % (ref 11.5–14.5)
GLOBULIN SER CALC-MCNC: 3.5 G/DL (ref 2–4)
GLUCOSE SERPL-MCNC: 85 MG/DL (ref 65–100)
GLUCOSE UR STRIP.AUTO-MCNC: NEGATIVE MG/DL
HCT VFR BLD AUTO: 39.2 % (ref 35–47)
HGB BLD-MCNC: 12.3 G/DL (ref 11.5–16)
HGB UR QL STRIP: ABNORMAL
IMM GRANULOCYTES # BLD AUTO: 0 K/UL (ref 0–0.04)
IMM GRANULOCYTES NFR BLD AUTO: 0 % (ref 0–0.5)
KETONES UR QL STRIP.AUTO: NEGATIVE MG/DL
LEUKOCYTE ESTERASE UR QL STRIP.AUTO: NEGATIVE
LIPASE SERPL-CCNC: 18 U/L (ref 13–75)
LYMPHOCYTES # BLD: 1.4 K/UL (ref 0.8–3.5)
LYMPHOCYTES NFR BLD: 34 % (ref 12–49)
MCH RBC QN AUTO: 28.3 PG (ref 26–34)
MCHC RBC AUTO-ENTMCNC: 31.4 G/DL (ref 30–36.5)
MCV RBC AUTO: 90.1 FL (ref 80–99)
MONOCYTES # BLD: 0.3 K/UL (ref 0–1)
MONOCYTES NFR BLD: 7 % (ref 5–13)
NEUTS SEG # BLD: 2.4 K/UL (ref 1.8–8)
NEUTS SEG NFR BLD: 56 % (ref 32–75)
NITRITE UR QL STRIP.AUTO: NEGATIVE
NRBC # BLD: 0 K/UL (ref 0–0.01)
NRBC BLD-RTO: 0 PER 100 WBC
PH UR STRIP: 7 (ref 5–8)
PLATELET # BLD AUTO: 294 K/UL (ref 150–400)
PMV BLD AUTO: 9.1 FL (ref 8.9–12.9)
POTASSIUM SERPL-SCNC: 3.5 MMOL/L (ref 3.5–5.1)
PROT SERPL-MCNC: 6.8 G/DL (ref 6.4–8.2)
PROT UR STRIP-MCNC: NEGATIVE MG/DL
RBC # BLD AUTO: 4.35 M/UL (ref 3.8–5.2)
RBC #/AREA URNS HPF: ABNORMAL /HPF (ref 0–5)
SODIUM SERPL-SCNC: 140 MMOL/L (ref 136–145)
SP GR UR REFRACTOMETRY: 1.01 (ref 1–1.03)
UROBILINOGEN UR QL STRIP.AUTO: 0.2 EU/DL (ref 0.2–1)
WBC # BLD AUTO: 4.2 K/UL (ref 3.6–11)
WBC URNS QL MICRO: ABNORMAL /HPF (ref 0–4)

## 2023-10-14 PROCEDURE — 87086 URINE CULTURE/COLONY COUNT: CPT

## 2023-10-14 PROCEDURE — 99284 EMERGENCY DEPT VISIT MOD MDM: CPT

## 2023-10-14 PROCEDURE — C9113 INJ PANTOPRAZOLE SODIUM, VIA: HCPCS | Performed by: FAMILY MEDICINE

## 2023-10-14 PROCEDURE — 76705 ECHO EXAM OF ABDOMEN: CPT

## 2023-10-14 PROCEDURE — 80053 COMPREHEN METABOLIC PANEL: CPT

## 2023-10-14 PROCEDURE — 36415 COLL VENOUS BLD VENIPUNCTURE: CPT

## 2023-10-14 PROCEDURE — 96374 THER/PROPH/DIAG INJ IV PUSH: CPT

## 2023-10-14 PROCEDURE — 83690 ASSAY OF LIPASE: CPT

## 2023-10-14 PROCEDURE — 85025 COMPLETE CBC W/AUTO DIFF WBC: CPT

## 2023-10-14 PROCEDURE — 81001 URINALYSIS AUTO W/SCOPE: CPT

## 2023-10-14 PROCEDURE — 6360000002 HC RX W HCPCS: Performed by: FAMILY MEDICINE

## 2023-10-14 PROCEDURE — 87186 SC STD MICRODIL/AGAR DIL: CPT

## 2023-10-14 PROCEDURE — 2580000003 HC RX 258: Performed by: FAMILY MEDICINE

## 2023-10-14 PROCEDURE — 96375 TX/PRO/DX INJ NEW DRUG ADDON: CPT

## 2023-10-14 PROCEDURE — 87077 CULTURE AEROBIC IDENTIFY: CPT

## 2023-10-14 RX ORDER — SUCRALFATE 1 G/1
1 TABLET ORAL 4 TIMES DAILY
Qty: 40 TABLET | Refills: 0 | Status: SHIPPED | OUTPATIENT
Start: 2023-10-14 | End: 2023-10-24

## 2023-10-14 RX ORDER — 0.9 % SODIUM CHLORIDE 0.9 %
1000 INTRAVENOUS SOLUTION INTRAVENOUS ONCE
Status: COMPLETED | OUTPATIENT
Start: 2023-10-14 | End: 2023-10-14

## 2023-10-14 RX ORDER — ONDANSETRON 2 MG/ML
4 INJECTION INTRAMUSCULAR; INTRAVENOUS
Status: COMPLETED | OUTPATIENT
Start: 2023-10-14 | End: 2023-10-14

## 2023-10-14 RX ORDER — PANTOPRAZOLE SODIUM 40 MG/1
40 TABLET, DELAYED RELEASE ORAL
Qty: 30 TABLET | Refills: 5 | Status: SHIPPED | OUTPATIENT
Start: 2023-10-14

## 2023-10-14 RX ORDER — ONDANSETRON 4 MG/1
4 TABLET, ORALLY DISINTEGRATING ORAL 3 TIMES DAILY PRN
Qty: 21 TABLET | Refills: 0 | Status: SHIPPED | OUTPATIENT
Start: 2023-10-14

## 2023-10-14 RX ADMIN — SODIUM CHLORIDE, PRESERVATIVE FREE 40 MG: 5 INJECTION INTRAVENOUS at 17:38

## 2023-10-14 RX ADMIN — ONDANSETRON 4 MG: 2 INJECTION INTRAMUSCULAR; INTRAVENOUS at 17:42

## 2023-10-14 RX ADMIN — SODIUM CHLORIDE 1000 ML: 9 INJECTION, SOLUTION INTRAVENOUS at 17:41

## 2023-10-14 ASSESSMENT — ENCOUNTER SYMPTOMS
BACK PAIN: 0
ABDOMINAL PAIN: 1
NAUSEA: 1
VOMITING: 1
COUGH: 0
SHORTNESS OF BREATH: 0

## 2023-10-14 NOTE — ED TRIAGE NOTES
Patient arrives with c/o dehydration. Patient has a hx of POTS.  Patient went to [facility] for covid, flu test and pregnancy test and was sent here for eval.

## 2023-10-14 NOTE — ED PROVIDER NOTES
[facility] EMERGENCY CTR  EMERGENCY DEPARTMENT ENCOUNTER      Pt Name: Candido Wallace  MRN: [id number]  [address] [place] 1990  Date of evaluation: 10/14/2023  Provider: ERASMO Harding NP    [address]       Chief Complaint   Patient presents with    Nausea    Headache    Fatigue         HISTORY OF PRESENT ILLNESS   (Location/Symptom, Timing/Onset, Context/Setting, Quality, Duration, Modifying Factors, Severity)  Note limiting factors. Patient is a 79-year-old female with past medical history of vestibular migraines, anxiety presenting to the emergency department for evaluation of nausea. Patient reports that over the past several months, she switched her diet to a low sodium diet and has recently struggled with acid reflux type symptoms including epigastric discomfort and nausea. A few days ago, she had pizza for the first time in a while and seem to flareup her symptoms. Notes that she has been taking Pepto-Bismol for the past month and recently started famotidine over-the-counter without much symptom relief. She has a scheduled appointment for an EGD in early November. The history is provided by the patient. Review of External Medical Records:     Nursing Notes were reviewed. REVIEW OF SYSTEMS    (2-9 systems for level 4, 10 or more for level 5)     Review of Systems   Constitutional:  Negative for unexpected weight change. HENT:  Negative for congestion. Eyes:  Negative for visual disturbance. Respiratory:  Negative for cough and shortness of breath. Cardiovascular:  Negative for chest pain and palpitations. Gastrointestinal:  Positive for abdominal pain, nausea and vomiting. Endocrine: Negative for polyuria. Genitourinary:  Negative for dysuria and flank pain. Musculoskeletal:  Negative for back pain. Skin:  Negative for pallor. Allergic/Immunologic: Negative for immunocompromised state. Neurological:  Negative for dizziness and headaches.    Hematological:

## 2023-10-14 NOTE — DISCHARGE INSTRUCTIONS
Your labs today were all stable without clear etiology of your symptoms. Your ultrasound did not reveal evidence of gallstones or gallbladder infection. There is no evidence of pancreatitis on your blood work today. I suspect that your symptoms are related to acid reflux and/or peptic ulcer disease. I am placing you on medication to take to help with the nausea and to help with the symptoms. Please keep your GI appointment in November for your endoscopy.

## 2023-10-14 NOTE — ED NOTES
Patient given discharge paperwork and instructions by RN and provider. Patient verbalized understanding. No signs of distress at time of discharge. Patient ambulatory out of ER with steady gait.         David Cox RN  10/14/23 1947

## 2023-10-16 ENCOUNTER — APPOINTMENT (OUTPATIENT)
Facility: HOSPITAL | Age: 33
End: 2023-10-16
Payer: COMMERCIAL

## 2023-10-16 ENCOUNTER — HOSPITAL ENCOUNTER (EMERGENCY)
Facility: HOSPITAL | Age: 33
Discharge: HOME | End: 2023-10-16
Attending: EMERGENCY MEDICINE
Payer: COMMERCIAL

## 2023-10-16 VITALS
DIASTOLIC BLOOD PRESSURE: 85 MMHG | TEMPERATURE: 97.6 F | BODY MASS INDEX: 35.37 KG/M2 | RESPIRATION RATE: 18 BRPM | SYSTOLIC BLOOD PRESSURE: 129 MMHG | HEIGHT: 65 IN | OXYGEN SATURATION: 98 % | WEIGHT: 212.3 LBS | HEART RATE: 70 BPM

## 2023-10-16 DIAGNOSIS — D25.9 UTERINE LEIOMYOMA, UNSPECIFIED LOCATION: ICD-10-CM

## 2023-10-16 DIAGNOSIS — R10.11 RIGHT UPPER QUADRANT ABDOMINAL PAIN: Primary | ICD-10-CM

## 2023-10-16 LAB
ALBUMIN SERPL-MCNC: 3.7 G/DL (ref 3.5–5)
ALBUMIN/GLOB SERPL: 1 (ref 1.1–2.2)
ALP SERPL-CCNC: 60 U/L (ref 45–117)
ALT SERPL-CCNC: 16 U/L (ref 12–78)
ANION GAP SERPL CALC-SCNC: 3 MMOL/L (ref 5–15)
AST SERPL-CCNC: 9 U/L (ref 15–37)
BASOPHILS # BLD: 0 K/UL (ref 0–0.1)
BASOPHILS NFR BLD: 1 % (ref 0–1)
BILIRUB SERPL-MCNC: 0.4 MG/DL (ref 0.2–1)
BUN SERPL-MCNC: 7 MG/DL (ref 6–20)
BUN/CREAT SERPL: 7 (ref 12–20)
CALCIUM SERPL-MCNC: 8.9 MG/DL (ref 8.5–10.1)
CHLORIDE SERPL-SCNC: 107 MMOL/L (ref 97–108)
CO2 SERPL-SCNC: 28 MMOL/L (ref 21–32)
COMMENT:: NORMAL
CREAT SERPL-MCNC: 1.01 MG/DL (ref 0.55–1.02)
DIFFERENTIAL METHOD BLD: NORMAL
EOSINOPHIL # BLD: 0.2 K/UL (ref 0–0.4)
EOSINOPHIL NFR BLD: 3 % (ref 0–7)
ERYTHROCYTE [DISTWIDTH] IN BLOOD BY AUTOMATED COUNT: 13.5 % (ref 11.5–14.5)
GLOBULIN SER CALC-MCNC: 3.8 G/DL (ref 2–4)
GLUCOSE SERPL-MCNC: 91 MG/DL (ref 65–100)
HCG SERPL QL: NEGATIVE
HCT VFR BLD AUTO: 40.9 % (ref 35–47)
HGB BLD-MCNC: 12.9 G/DL (ref 11.5–16)
IMM GRANULOCYTES # BLD AUTO: 0 K/UL (ref 0–0.04)
IMM GRANULOCYTES NFR BLD AUTO: 0 % (ref 0–0.5)
LIPASE SERPL-CCNC: 20 U/L (ref 13–75)
LYMPHOCYTES # BLD: 2.4 K/UL (ref 0.8–3.5)
LYMPHOCYTES NFR BLD: 40 % (ref 12–49)
MCH RBC QN AUTO: 28.1 PG (ref 26–34)
MCHC RBC AUTO-ENTMCNC: 31.5 G/DL (ref 30–36.5)
MCV RBC AUTO: 89.1 FL (ref 80–99)
MONOCYTES # BLD: 0.4 K/UL (ref 0–1)
MONOCYTES NFR BLD: 7 % (ref 5–13)
NEUTS SEG # BLD: 2.9 K/UL (ref 1.8–8)
NEUTS SEG NFR BLD: 49 % (ref 32–75)
NRBC # BLD: 0 K/UL (ref 0–0.01)
NRBC BLD-RTO: 0 PER 100 WBC
PLATELET # BLD AUTO: 312 K/UL (ref 150–400)
PMV BLD AUTO: 9.1 FL (ref 8.9–12.9)
POTASSIUM SERPL-SCNC: 3.6 MMOL/L (ref 3.5–5.1)
PROT SERPL-MCNC: 7.5 G/DL (ref 6.4–8.2)
RBC # BLD AUTO: 4.59 M/UL (ref 3.8–5.2)
SODIUM SERPL-SCNC: 138 MMOL/L (ref 136–145)
SPECIMEN HOLD: NORMAL
WBC # BLD AUTO: 5.9 K/UL (ref 3.6–11)

## 2023-10-16 PROCEDURE — 2580000003 HC RX 258

## 2023-10-16 PROCEDURE — 96361 HYDRATE IV INFUSION ADD-ON: CPT

## 2023-10-16 PROCEDURE — 83690 ASSAY OF LIPASE: CPT

## 2023-10-16 PROCEDURE — 80053 COMPREHEN METABOLIC PANEL: CPT

## 2023-10-16 PROCEDURE — 96374 THER/PROPH/DIAG INJ IV PUSH: CPT

## 2023-10-16 PROCEDURE — 99285 EMERGENCY DEPT VISIT HI MDM: CPT

## 2023-10-16 PROCEDURE — 85025 COMPLETE CBC W/AUTO DIFF WBC: CPT

## 2023-10-16 PROCEDURE — 36415 COLL VENOUS BLD VENIPUNCTURE: CPT

## 2023-10-16 PROCEDURE — 84703 CHORIONIC GONADOTROPIN ASSAY: CPT

## 2023-10-16 PROCEDURE — 96375 TX/PRO/DX INJ NEW DRUG ADDON: CPT

## 2023-10-16 PROCEDURE — 74177 CT ABD & PELVIS W/CONTRAST: CPT

## 2023-10-16 PROCEDURE — 6360000004 HC RX CONTRAST MEDICATION

## 2023-10-16 PROCEDURE — 6360000002 HC RX W HCPCS

## 2023-10-16 RX ORDER — PROCHLORPERAZINE EDISYLATE 5 MG/ML
10 INJECTION INTRAMUSCULAR; INTRAVENOUS ONCE
Status: COMPLETED | OUTPATIENT
Start: 2023-10-16 | End: 2023-10-16

## 2023-10-16 RX ORDER — KETOROLAC TROMETHAMINE 30 MG/ML
30 INJECTION, SOLUTION INTRAMUSCULAR; INTRAVENOUS
Status: COMPLETED | OUTPATIENT
Start: 2023-10-16 | End: 2023-10-16

## 2023-10-16 RX ORDER — 0.9 % SODIUM CHLORIDE 0.9 %
1000 INTRAVENOUS SOLUTION INTRAVENOUS ONCE
Status: COMPLETED | OUTPATIENT
Start: 2023-10-16 | End: 2023-10-16

## 2023-10-16 RX ADMIN — IOPAMIDOL 100 ML: 755 INJECTION, SOLUTION INTRAVENOUS at 15:30

## 2023-10-16 RX ADMIN — SODIUM CHLORIDE 1000 ML: 9 INJECTION, SOLUTION INTRAVENOUS at 14:00

## 2023-10-16 RX ADMIN — KETOROLAC TROMETHAMINE 30 MG: 30 INJECTION, SOLUTION INTRAMUSCULAR at 13:59

## 2023-10-16 RX ADMIN — PROCHLORPERAZINE EDISYLATE 10 MG: 5 INJECTION INTRAMUSCULAR; INTRAVENOUS at 14:00

## 2023-10-16 ASSESSMENT — PAIN - FUNCTIONAL ASSESSMENT: PAIN_FUNCTIONAL_ASSESSMENT: PREVENTS OR INTERFERES WITH MANY ACTIVE NOT PASSIVE ACTIVITIES

## 2023-10-16 ASSESSMENT — ENCOUNTER SYMPTOMS
RHINORRHEA: 0
SORE THROAT: 0
VOMITING: 1
SHORTNESS OF BREATH: 0
COUGH: 0
ABDOMINAL PAIN: 1
DIARRHEA: 0

## 2023-10-16 ASSESSMENT — PAIN DESCRIPTION - DESCRIPTORS: DESCRIPTORS: SHARP

## 2023-10-16 ASSESSMENT — PAIN DESCRIPTION - ONSET: ONSET: PROGRESSIVE

## 2023-10-16 ASSESSMENT — PAIN DESCRIPTION - DIRECTION: RADIATING_TOWARDS: RIGHT BACK

## 2023-10-16 ASSESSMENT — PAIN DESCRIPTION - PAIN TYPE: TYPE: ACUTE PAIN

## 2023-10-16 ASSESSMENT — PAIN SCALES - GENERAL: PAINLEVEL_OUTOF10: 5

## 2023-10-16 ASSESSMENT — PAIN DESCRIPTION - FREQUENCY: FREQUENCY: INTERMITTENT

## 2023-10-16 ASSESSMENT — PAIN DESCRIPTION - LOCATION: LOCATION: ABDOMEN

## 2023-10-16 ASSESSMENT — PAIN DESCRIPTION - ORIENTATION: ORIENTATION: RIGHT

## 2023-10-16 NOTE — ED PROVIDER NOTES
reviewed. Constitutional:       General: She is not in acute distress. Appearance: Normal appearance. She is not ill-appearing. HENT:      Head: Normocephalic and atraumatic. Cardiovascular:      Rate and Rhythm: Normal rate. Pulmonary:      Effort: Pulmonary effort is normal. No respiratory distress. Breath sounds: Normal breath sounds. No wheezing, rhonchi or rales. Abdominal:      General: Bowel sounds are normal. There is no distension. Palpations: There is no mass. Tenderness: There is abdominal tenderness in the right upper quadrant and epigastric area. There is no right CVA tenderness, left CVA tenderness or guarding. Hernia: No hernia is present. Musculoskeletal:         General: Normal range of motion. Cervical back: Normal range of motion. Skin:     General: Skin is warm. Capillary Refill: Capillary refill takes less than 2 seconds. Neurological:      General: No focal deficit present. Mental Status: She is alert and oriented to person, place, and time. Mental status is at baseline. Sensory: No sensory deficit. Gait: Gait normal.   Psychiatric:         Mood and Affect: Mood normal.         DIAGNOSTIC RESULTS     EKG: All EKG's are interpreted by the Emergency Department Physician who either signs or Co-signs this chart in the absence of a cardiologist.        RADIOLOGY:   Non-plain film images such as CT, Ultrasound and MRI are read by the radiologist. Plain radiographic images are visualized and preliminarily interpreted by the emergency physician with the below findings:        Interpretation per the Radiologist below, if available at the time of this note:    CT ABDOMEN PELVIS W IV CONTRAST Additional Contrast? None   Final Result      1. No acute abdominal or pelvic pathology. 2. Uterine fibroid. 3. Small volume of physiologic free fluid in the pelvis.            LABS:  Labs Reviewed   COMPREHENSIVE METABOLIC PANEL - Abnormal; Notable

## 2023-10-16 NOTE — ED TRIAGE NOTES
TRIAGE: Pt arrives with c/o nausea and vomiting that started last Monday. Pt reports \"feeling full\" with PO intake. Pt went to ER Saturday and has zofran that is not resolving symptoms. Denies blood in emesis, diarrhea. +right-sided abdominal discomfort. Hx migraines and reports dizziness that started earlier this week.

## 2023-10-16 NOTE — DISCHARGE INSTRUCTIONS
Take the zofran and antacid as instructed. Follow-up with your primary care provider in 3-4 days for reevaluation. Call for appointment as soon as possible. Follow-up with your OBGYN if you have pains with your uterine fibroids.

## 2023-10-17 LAB
BACTERIA SPEC CULT: ABNORMAL
CC UR VC: ABNORMAL
SERVICE CMNT-IMP: ABNORMAL

## 2023-10-18 RX ORDER — CEFDINIR 300 MG/1
300 CAPSULE ORAL 2 TIMES DAILY
Qty: 14 CAPSULE | Refills: 0 | Status: SHIPPED | OUTPATIENT
Start: 2023-10-18 | End: 2023-10-25

## 2023-10-18 RX ORDER — CEPHALEXIN 500 MG/1
500 CAPSULE ORAL 3 TIMES DAILY
Qty: 21 CAPSULE | Refills: 0 | Status: SHIPPED | OUTPATIENT
Start: 2023-10-18 | End: 2023-10-25

## 2023-10-18 NOTE — ED NOTES
Pt called back requesting keflex in place of omnicef.   I called pharmacy and cancelled omnicef    Erx for keflex 500 mg tid x 7 d in place sent     Minor Oliver PA-C  10/18/23 2539

## 2023-10-26 ENCOUNTER — HOSPITAL ENCOUNTER (OUTPATIENT)
Facility: HOSPITAL | Age: 33
Discharge: HOME | End: 2023-10-26
Attending: INTERNAL MEDICINE
Payer: COMMERCIAL

## 2023-10-26 ENCOUNTER — TRANSCRIBE ORDERS (OUTPATIENT)
Facility: HOSPITAL | Age: 33
End: 2023-10-26

## 2023-10-26 DIAGNOSIS — R14.2 BELCHING: ICD-10-CM

## 2023-10-26 DIAGNOSIS — R63.4 WEIGHT LOSS: ICD-10-CM

## 2023-10-26 DIAGNOSIS — R10.816 EPIGASTRIC ABDOMINAL TENDERNESS ON DIRECT PALPATION: ICD-10-CM

## 2023-10-26 DIAGNOSIS — E66.9 OBESITY, UNSPECIFIED CLASSIFICATION, UNSPECIFIED OBESITY TYPE, UNSPECIFIED WHETHER SERIOUS COMORBIDITY PRESENT: ICD-10-CM

## 2023-10-26 DIAGNOSIS — K59.09 CONSTIPATION, CHRONIC: ICD-10-CM

## 2023-10-26 DIAGNOSIS — R14.2 BELCHING: Primary | ICD-10-CM

## 2023-10-26 DIAGNOSIS — R11.0 NAUSEA: ICD-10-CM

## 2023-10-26 DIAGNOSIS — G43.809 VESTIBULAR MIGRAINE: ICD-10-CM

## 2023-10-26 PROCEDURE — 74018 RADEX ABDOMEN 1 VIEW: CPT

## 2024-02-08 ENCOUNTER — TELEPHONE (OUTPATIENT)
Age: 34
End: 2024-02-08

## 2024-02-08 ENCOUNTER — OFFICE VISIT (OUTPATIENT)
Age: 34
End: 2024-02-08

## 2024-02-08 VITALS — BODY MASS INDEX: 31.99 KG/M2 | HEIGHT: 65 IN | WEIGHT: 192 LBS

## 2024-02-08 DIAGNOSIS — L98.8 SKIN LESION OF BREAST: Primary | ICD-10-CM

## 2024-02-08 NOTE — PROGRESS NOTES
HISTORY OF PRESENT ILLNESS  Syed Alberto is a 33 y.o. female     HPI NEW patient consult referred by Dr. Whitt for LEFT breast wound. She had a skin biopsy 1 year ago, and now has a chronic wound.  On abx 4x over the past year.   Treated at the wound care clinic from 4 - 6/2023    Family History:  Maternal uncle had breast cancer, age unknown  Paternal cousin had breast cancer age 40       Mammogram, n/a, BIRADS n/a    Past Medical History:   Diagnosis Date    Anxiety     Keratoconus     Vestibular migraine      No past surgical history on file.   OB History    No obstetric history on file.      Obstetric Comments   Menarche 15, LMP 2/3/2024, # of children 0, age of 1st delivery n/a, Hysterectomy/oophorectomy no/no, Breast bx yes/left march 2023, history of breast feeding n/a, BCP yes, Hormone therapy no             No family history on file.  Social History     Tobacco Use    Smoking status: Never    Smokeless tobacco: Never   Substance Use Topics    Alcohol use: Never      Prior to Admission medications    Medication Sig Start Date End Date Taking? Authorizing Provider   ondansetron (ZOFRAN-ODT) 4 MG disintegrating tablet Take 1 tablet by mouth 3 times daily as needed for Nausea or Vomiting 10/14/23  Yes Terri Fang APRN - NP   pantoprazole (PROTONIX) 40 MG tablet Take 1 tablet by mouth every morning (before breakfast) 10/14/23  Yes Terri Fang APRN - NP   fexofenadine (ALLEGRA ALLERGY) 180 MG tablet Take 1 tablet by mouth daily   Yes ProviderTor MD   sucralfate (CARAFATE) 1 GM tablet Take 1 tablet by mouth 4 times daily for 10 days 10/14/23 10/24/23  Terri Fang APRN - NP   triamcinolone (NASACORT ALLERGY 24HR) 55 MCG/ACT nasal inhaler 2 sprays by Each Nostril route daily    ProviderTor MD      No Known Allergies         Review of Systems      Physical Exam  Cardiovascular:      Rate and Rhythm: Normal rate and regular rhythm.   Pulmonary:      Effort: Pulmonary effort

## 2024-05-30 ENCOUNTER — HOSPITAL ENCOUNTER (OUTPATIENT)
Facility: HOSPITAL | Age: 34
Discharge: HOME | End: 2024-05-30
Payer: COMMERCIAL

## 2024-05-30 ENCOUNTER — HOSPITAL ENCOUNTER (OUTPATIENT)
Facility: HOSPITAL | Age: 34
End: 2024-05-30
Payer: COMMERCIAL

## 2024-05-30 DIAGNOSIS — R42 DIZZINESS AND GIDDINESS: ICD-10-CM

## 2024-05-30 DIAGNOSIS — R10.84 ABDOMINAL PAIN, GENERALIZED: ICD-10-CM

## 2024-05-30 PROCEDURE — 70553 MRI BRAIN STEM W/O & W/DYE: CPT

## 2024-05-30 PROCEDURE — 6360000004 HC RX CONTRAST MEDICATION

## 2024-05-30 PROCEDURE — 74177 CT ABD & PELVIS W/CONTRAST: CPT

## 2024-05-30 RX ADMIN — IOPAMIDOL 100 ML: 755 INJECTION, SOLUTION INTRAVENOUS at 14:32

## 2024-06-18 SDOH — HEALTH STABILITY: PHYSICAL HEALTH: ON AVERAGE, HOW MANY MINUTES DO YOU ENGAGE IN EXERCISE AT THIS LEVEL?: 0 MIN

## 2024-06-18 SDOH — HEALTH STABILITY: PHYSICAL HEALTH: ON AVERAGE, HOW MANY DAYS PER WEEK DO YOU ENGAGE IN MODERATE TO STRENUOUS EXERCISE (LIKE A BRISK WALK)?: 0 DAYS

## 2024-06-20 ENCOUNTER — OFFICE VISIT (OUTPATIENT)
Facility: CLINIC | Age: 34
End: 2024-06-20
Payer: COMMERCIAL

## 2024-06-20 VITALS
TEMPERATURE: 98.3 F | RESPIRATION RATE: 16 BRPM | HEIGHT: 65 IN | HEART RATE: 93 BPM | OXYGEN SATURATION: 97 % | SYSTOLIC BLOOD PRESSURE: 114 MMHG | WEIGHT: 158.8 LBS | BODY MASS INDEX: 26.46 KG/M2 | DIASTOLIC BLOOD PRESSURE: 76 MMHG

## 2024-06-20 DIAGNOSIS — F32.A ANXIETY AND DEPRESSION: ICD-10-CM

## 2024-06-20 DIAGNOSIS — F41.9 ANXIETY AND DEPRESSION: ICD-10-CM

## 2024-06-20 DIAGNOSIS — E55.9 VITAMIN D DEFICIENCY: ICD-10-CM

## 2024-06-20 DIAGNOSIS — E53.8 B12 DEFICIENCY: ICD-10-CM

## 2024-06-20 DIAGNOSIS — R01.1 HEART MURMUR: ICD-10-CM

## 2024-06-20 DIAGNOSIS — Z76.89 ENCOUNTER TO ESTABLISH CARE: Primary | ICD-10-CM

## 2024-06-20 DIAGNOSIS — D50.8 IRON DEFICIENCY ANEMIA SECONDARY TO INADEQUATE DIETARY IRON INTAKE: ICD-10-CM

## 2024-06-20 PROCEDURE — 99204 OFFICE O/P NEW MOD 45 MIN: CPT | Performed by: INTERNAL MEDICINE

## 2024-06-20 RX ORDER — NAPROXEN 500 MG/1
500 TABLET ORAL 2 TIMES DAILY WITH MEALS
COMMUNITY

## 2024-06-20 RX ORDER — CLOBETASOL PROPIONATE 0.5 MG/G
1 CREAM TOPICAL 2 TIMES DAILY
COMMUNITY
Start: 2024-05-01

## 2024-06-20 RX ORDER — DIPHENHYDRAMINE HCL 25 MG
25 CAPSULE ORAL NIGHTLY PRN
COMMUNITY

## 2024-06-20 RX ORDER — TRAZODONE HYDROCHLORIDE 50 MG/1
50 TABLET ORAL NIGHTLY
Qty: 90 TABLET | Refills: 1 | Status: SHIPPED | OUTPATIENT
Start: 2024-06-20

## 2024-06-20 RX ORDER — FAMOTIDINE 20 MG/1
20 TABLET, FILM COATED ORAL 2 TIMES DAILY
COMMUNITY

## 2024-06-20 RX ORDER — ALPRAZOLAM 0.5 MG/1
0.5 TABLET ORAL 3 TIMES DAILY PRN
COMMUNITY

## 2024-06-20 RX ORDER — SIMETHICONE 40MG/0.6ML
10 SUSPENSION, DROPS(FINAL DOSAGE FORM)(ML) ORAL DAILY
COMMUNITY

## 2024-06-20 RX ORDER — BETAMETHASONE DIPROPIONATE 0.5 MG/G
1 CREAM TOPICAL 2 TIMES DAILY
COMMUNITY
Start: 2024-04-11

## 2024-06-20 RX ORDER — PROMETHAZINE HYDROCHLORIDE 25 MG/1
25 TABLET ORAL EVERY 8 HOURS PRN
COMMUNITY
Start: 2023-11-18

## 2024-06-20 RX ORDER — MAGNESIUM SALICYLATE/CAFFEINE 162.5-50MG
50 TABLET ORAL DAILY
COMMUNITY

## 2024-06-20 RX ORDER — MECLIZINE HCL 25MG 25 MG/1
25 TABLET, CHEWABLE ORAL 2 TIMES DAILY PRN
COMMUNITY

## 2024-06-20 RX ORDER — MELATONIN/PYRIDOXINE HCL (B6) 10 MG-10MG
10 TABLET,IMMED, EXTENDED RELEASE, BIPHASIC ORAL NIGHTLY
COMMUNITY

## 2024-06-20 SDOH — ECONOMIC STABILITY: FOOD INSECURITY: WITHIN THE PAST 12 MONTHS, THE FOOD YOU BOUGHT JUST DIDN'T LAST AND YOU DIDN'T HAVE MONEY TO GET MORE.: NEVER TRUE

## 2024-06-20 SDOH — ECONOMIC STABILITY: INCOME INSECURITY: HOW HARD IS IT FOR YOU TO PAY FOR THE VERY BASICS LIKE FOOD, HOUSING, MEDICAL CARE, AND HEATING?: NOT HARD AT ALL

## 2024-06-20 SDOH — ECONOMIC STABILITY: FOOD INSECURITY: WITHIN THE PAST 12 MONTHS, YOU WORRIED THAT YOUR FOOD WOULD RUN OUT BEFORE YOU GOT MONEY TO BUY MORE.: NEVER TRUE

## 2024-06-20 SDOH — ECONOMIC STABILITY: HOUSING INSECURITY
IN THE LAST 12 MONTHS, WAS THERE A TIME WHEN YOU DID NOT HAVE A STEADY PLACE TO SLEEP OR SLEPT IN A SHELTER (INCLUDING NOW)?: NO

## 2024-06-20 ASSESSMENT — PATIENT HEALTH QUESTIONNAIRE - PHQ9
2. FEELING DOWN, DEPRESSED OR HOPELESS: NOT AT ALL
1. LITTLE INTEREST OR PLEASURE IN DOING THINGS: NOT AT ALL
SUM OF ALL RESPONSES TO PHQ QUESTIONS 1-9: 0
SUM OF ALL RESPONSES TO PHQ9 QUESTIONS 1 & 2: 0

## 2024-06-20 ASSESSMENT — ANXIETY QUESTIONNAIRES
GAD7 TOTAL SCORE: 0
1. FEELING NERVOUS, ANXIOUS, OR ON EDGE: NOT AT ALL
6. BECOMING EASILY ANNOYED OR IRRITABLE: NOT AT ALL
7. FEELING AFRAID AS IF SOMETHING AWFUL MIGHT HAPPEN: NOT AT ALL
2. NOT BEING ABLE TO STOP OR CONTROL WORRYING: NOT AT ALL
IF YOU CHECKED OFF ANY PROBLEMS ON THIS QUESTIONNAIRE, HOW DIFFICULT HAVE THESE PROBLEMS MADE IT FOR YOU TO DO YOUR WORK, TAKE CARE OF THINGS AT HOME, OR GET ALONG WITH OTHER PEOPLE: NOT DIFFICULT AT ALL
3. WORRYING TOO MUCH ABOUT DIFFERENT THINGS: NOT AT ALL
5. BEING SO RESTLESS THAT IT IS HARD TO SIT STILL: NOT AT ALL
4. TROUBLE RELAXING: NOT AT ALL

## 2024-06-20 NOTE — PROGRESS NOTES
Chief Complaint   Patient presents with    New Patient     Establishing care   Patient states \" she would like to discuss anxiety and getting lab work done today.\"  \"Have you been to the ER, urgent care clinic since your last visit?  Hospitalized since your last visit?\"    NO    “Have you seen or consulted any other health care providers outside of [facility] since your last visit?”    NO     “Have you had a pap smear?”    YES - Where: [facility]   Nurse/MANUEL to request most recent records if not in the chart    No cervical cancer screening on file             Click Here for Release of Records Request  
Lymphocytes Absolute 10/16/2023 2.4  0.8 - 3.5 K/UL Final    Monocytes Absolute 10/16/2023 0.4  0.0 - 1.0 K/UL Final    Eosinophils Absolute 10/16/2023 0.2  0.0 - 0.4 K/UL Final    Basophils Absolute 10/16/2023 0.0  0.0 - 0.1 K/UL Final    Immature Granulocytes Absolute 10/16/2023 0.0  0.00 - 0.04 K/UL Final    Differential Type 10/16/2023 AUTOMATED    Final    Sodium 10/16/2023 138  136 - 145 mmol/L Final    Potassium 10/16/2023 3.6  3.5 - 5.1 mmol/L Final    Chloride 10/16/2023 107  97 - 108 mmol/L Final    CO2 10/16/2023 28  21 - 32 mmol/L Final    Anion Gap 10/16/2023 3 (L)  5 - 15 mmol/L Final    Glucose 10/16/2023 91  65 - 100 mg/dL Final    BUN 10/16/2023 7  6 - 20 MG/DL Final    Creatinine 10/16/2023 1.01  0.55 - 1.02 MG/DL Final    BUN/Creatinine Ratio 10/16/2023 7 (L)  12 - 20   Final    Est, Glom Filt Rate 10/16/2023 >60  >60 ml/min/1.73m2 Final    Comment:    Pediatric calculator link: https://www.kidney.org/professionals/kdoqi/gfr_calculatorped     These results are not intended for use in patients <18 years of age.     eGFR results are calculated without a race factor using  the 2021 CKD-EPI equation. Careful clinical correlation is recommended, particularly when comparing to results calculated using previous equations.  The CKD-EPI equation is less accurate in patients with extremes of muscle mass, extra-renal metabolism of creatinine, excessive creatine ingestion, or following therapy that affects renal tubular secretion.      Calcium 10/16/2023 8.9  8.5 - 10.1 MG/DL Final    Total Bilirubin 10/16/2023 0.4  0.2 - 1.0 MG/DL Final    ALT 10/16/2023 16  12 - 78 U/L Final    AST 10/16/2023 9 (L)  15 - 37 U/L Final    Alk Phosphatase 10/16/2023 60  45 - 117 U/L Final    Total Protein 10/16/2023 7.5  6.4 - 8.2 g/dL Final    Albumin 10/16/2023 3.7  3.5 - 5.0 g/dL Final    Globulin 10/16/2023 3.8  2.0 - 4.0 g/dL Final    Albumin/Globulin Ratio 10/16/2023 1.0 (L)  1.1 - 2.2   Final    Lipase 10/16/2023 20  13

## 2024-06-21 LAB
25(OH)D3 SERPL-MCNC: 20.2 NG/ML (ref 30–100)
ALBUMIN SERPL-MCNC: 3.4 G/DL (ref 3.5–5)
ALBUMIN/GLOB SERPL: 1 (ref 1.1–2.2)
ALP SERPL-CCNC: 55 U/L (ref 45–117)
ALT SERPL-CCNC: 17 U/L (ref 12–78)
ANION GAP SERPL CALC-SCNC: 5 MMOL/L (ref 5–15)
AST SERPL-CCNC: 25 U/L (ref 15–37)
BASOPHILS # BLD: 0 K/UL (ref 0–0.1)
BASOPHILS NFR BLD: 1 % (ref 0–1)
BILIRUB SERPL-MCNC: 0.5 MG/DL (ref 0.2–1)
BUN SERPL-MCNC: 12 MG/DL (ref 6–20)
BUN/CREAT SERPL: 13 (ref 12–20)
CALCIUM SERPL-MCNC: 9.1 MG/DL (ref 8.5–10.1)
CHLORIDE SERPL-SCNC: 109 MMOL/L (ref 97–108)
CO2 SERPL-SCNC: 25 MMOL/L (ref 21–32)
COMMENT:: NORMAL
CREAT SERPL-MCNC: 0.93 MG/DL (ref 0.55–1.02)
DIFFERENTIAL METHOD BLD: NORMAL
EOSINOPHIL # BLD: 0.1 K/UL (ref 0–0.4)
EOSINOPHIL NFR BLD: 4 % (ref 0–7)
ERYTHROCYTE [DISTWIDTH] IN BLOOD BY AUTOMATED COUNT: 13.6 % (ref 11.5–14.5)
FERRITIN SERPL-MCNC: 16 NG/ML (ref 26–388)
GLOBULIN SER CALC-MCNC: 3.4 G/DL (ref 2–4)
GLUCOSE SERPL-MCNC: 78 MG/DL (ref 65–100)
HBV SURFACE AB SER QL: NONREACTIVE
HBV SURFACE AB SER-ACNC: <3.1 MIU/ML
HCT VFR BLD AUTO: 38.1 % (ref 35–47)
HCV AB SER IA-ACNC: <0.02 INDEX
HCV AB SERPL QL IA: NONREACTIVE
HGB BLD-MCNC: 12.3 G/DL (ref 11.5–16)
HIV 1+2 AB+HIV1 P24 AG SERPL QL IA: NONREACTIVE
HIV 1/2 RESULT COMMENT: NORMAL
IMM GRANULOCYTES # BLD AUTO: 0 K/UL (ref 0–0.04)
IMM GRANULOCYTES NFR BLD AUTO: 0 % (ref 0–0.5)
IRON SATN MFR SERPL: 24 % (ref 20–50)
IRON SERPL-MCNC: 84 UG/DL (ref 35–150)
LYMPHOCYTES # BLD: 1.3 K/UL (ref 0.8–3.5)
LYMPHOCYTES NFR BLD: 36 % (ref 12–49)
MCH RBC QN AUTO: 29.9 PG (ref 26–34)
MCHC RBC AUTO-ENTMCNC: 32.3 G/DL (ref 30–36.5)
MCV RBC AUTO: 92.5 FL (ref 80–99)
MONOCYTES # BLD: 0.2 K/UL (ref 0–1)
MONOCYTES NFR BLD: 7 % (ref 5–13)
NEUTS SEG # BLD: 1.9 K/UL (ref 1.8–8)
NEUTS SEG NFR BLD: 52 % (ref 32–75)
NRBC # BLD: 0 K/UL (ref 0–0.01)
NRBC BLD-RTO: 0 PER 100 WBC
PLATELET # BLD AUTO: 303 K/UL (ref 150–400)
PMV BLD AUTO: 10.4 FL (ref 8.9–12.9)
POTASSIUM SERPL-SCNC: 5 MMOL/L (ref 3.5–5.1)
PROT SERPL-MCNC: 6.8 G/DL (ref 6.4–8.2)
RBC # BLD AUTO: 4.12 M/UL (ref 3.8–5.2)
SODIUM SERPL-SCNC: 139 MMOL/L (ref 136–145)
SPECIMEN HOLD: NORMAL
TIBC SERPL-MCNC: 346 UG/DL (ref 250–450)
VIT B12 SERPL-MCNC: 467 PG/ML (ref 193–986)
WBC # BLD AUTO: 3.6 K/UL (ref 3.6–11)

## 2024-06-22 RX ORDER — ERGOCALCIFEROL 1.25 MG/1
50000 CAPSULE ORAL WEEKLY
Qty: 12 CAPSULE | Refills: 1 | Status: SHIPPED | OUTPATIENT
Start: 2024-06-22

## 2024-06-27 ENCOUNTER — HOSPITAL ENCOUNTER (OUTPATIENT)
Facility: HOSPITAL | Age: 34
Discharge: HOME | End: 2024-06-29
Attending: INTERNAL MEDICINE
Payer: COMMERCIAL

## 2024-06-27 DIAGNOSIS — R01.1 HEART MURMUR: ICD-10-CM

## 2024-06-27 LAB
ECHO AO ROOT DIAM: 2.8 CM
ECHO AV AREA PLAN: 2.6 CM2
ECHO EST RA PRESSURE: 5 MMHG
ECHO LA DIAMETER: 2.1 CM
ECHO LA TO AORTIC ROOT RATIO: 0.75
ECHO LA VOL A-L A4C: 35 ML (ref 22–52)
ECHO LA VOL MOD A4C: 37 ML (ref 22–52)
ECHO LV EDV A4C: 99 ML
ECHO LV EJECTION FRACTION A4C: 62 %
ECHO LV ESV A4C: 38 ML
ECHO LV FRACTIONAL SHORTENING: 26 % (ref 28–44)
ECHO LV INTERNAL DIMENSION DIASTOLIC: 4.6 CM (ref 3.9–5.3)
ECHO LV INTERNAL DIMENSION SYSTOLIC: 3.4 CM
ECHO LV IVSD: 0.7 CM (ref 0.6–0.9)
ECHO LV MASS 2D: 127.7 G (ref 67–162)
ECHO LV POSTERIOR WALL DIASTOLIC: 1 CM (ref 0.6–0.9)
ECHO LV RELATIVE WALL THICKNESS RATIO: 0.43
ECHO LVOT AREA: 3.5 CM2
ECHO LVOT DIAM: 2.1 CM
ECHO LVOT PEAK GRADIENT: 3 MMHG
ECHO LVOT PEAK VELOCITY: 0.9 M/S
ECHO MV A VELOCITY: 0.53 M/S
ECHO MV AREA PHT: 5.8 CM2
ECHO MV E DECELERATION TIME (DT): 131.1 MS
ECHO MV E VELOCITY: 0.47 M/S
ECHO MV E/A RATIO: 0.89
ECHO MV MAX VELOCITY: 0.9 M/S
ECHO MV MEAN GRADIENT: 2 MMHG
ECHO MV MEAN VELOCITY: 0.6 M/S
ECHO MV PEAK GRADIENT: 3 MMHG
ECHO MV PRESSURE HALF TIME (PHT): 38 MS
ECHO MV VTI: 19.2 CM
ECHO PULMONARY ARTERY END DIASTOLIC PRESSURE: 7 MMHG
ECHO PV MAX VELOCITY: 0.8 M/S
ECHO PV PEAK GRADIENT: 3 MMHG
ECHO RIGHT VENTRICULAR SYSTOLIC PRESSURE (RVSP): 25 MMHG
ECHO TV REGURGITANT MAX VELOCITY: 2.23 M/S
ECHO TV REGURGITANT PEAK GRADIENT: 20 MMHG

## 2024-06-27 PROCEDURE — 93306 TTE W/DOPPLER COMPLETE: CPT

## 2024-10-28 ENCOUNTER — OFFICE VISIT (OUTPATIENT)
Age: 34
End: 2024-10-28
Payer: COMMERCIAL

## 2024-10-28 VITALS
DIASTOLIC BLOOD PRESSURE: 74 MMHG | HEIGHT: 65 IN | WEIGHT: 152 LBS | BODY MASS INDEX: 25.33 KG/M2 | SYSTOLIC BLOOD PRESSURE: 118 MMHG | OXYGEN SATURATION: 99 % | HEART RATE: 92 BPM

## 2024-10-28 DIAGNOSIS — G43.809 VESTIBULAR MIGRAINE: Primary | ICD-10-CM

## 2024-10-28 PROCEDURE — 99204 OFFICE O/P NEW MOD 45 MIN: CPT | Performed by: PSYCHIATRY & NEUROLOGY

## 2024-10-28 RX ORDER — ERENUMAB-AOOE 140 MG/ML
140 INJECTION, SOLUTION SUBCUTANEOUS
Qty: 1 ADJUSTABLE DOSE PRE-FILLED PEN SYRINGE | Refills: 0 | Status: SHIPPED | COMMUNITY
Start: 2024-10-28

## 2024-10-28 RX ORDER — TOPIRAMATE 25 MG/1
TABLET, FILM COATED ORAL
COMMUNITY
Start: 2024-10-20 | End: 2024-10-28

## 2024-10-28 RX ORDER — SUCRALFATE 1 G/1
1 TABLET ORAL 2 TIMES DAILY
COMMUNITY
Start: 2024-09-11

## 2024-10-28 RX ORDER — UBROGEPANT 50 MG/1
TABLET ORAL
COMMUNITY

## 2024-10-28 RX ORDER — NORTRIPTYLINE HYDROCHLORIDE 25 MG/1
CAPSULE ORAL
COMMUNITY
Start: 2024-10-26 | End: 2024-10-28

## 2024-10-28 RX ORDER — CYCLOBENZAPRINE HCL 10 MG
10 TABLET ORAL EVERY EVENING
COMMUNITY
Start: 2024-09-27 | End: 2024-10-28

## 2024-10-28 RX ORDER — OMEPRAZOLE 10 MG/1
CAPSULE, DELAYED RELEASE ORAL
COMMUNITY
End: 2024-10-28

## 2024-10-28 RX ORDER — TRANEXAMIC ACID 650 MG/1
TABLET ORAL
COMMUNITY
Start: 2024-03-25

## 2024-10-28 RX ORDER — CITALOPRAM HYDROBROMIDE 10 MG/1
1 TABLET ORAL DAILY
COMMUNITY

## 2024-10-28 ASSESSMENT — PATIENT HEALTH QUESTIONNAIRE - PHQ9
SUM OF ALL RESPONSES TO PHQ9 QUESTIONS 1 & 2: 0
SUM OF ALL RESPONSES TO PHQ QUESTIONS 1-9: 0
SUM OF ALL RESPONSES TO PHQ QUESTIONS 1-9: 0
2. FEELING DOWN, DEPRESSED OR HOPELESS: NOT AT ALL
SUM OF ALL RESPONSES TO PHQ QUESTIONS 1-9: 0
SUM OF ALL RESPONSES TO PHQ QUESTIONS 1-9: 0
1. LITTLE INTEREST OR PLEASURE IN DOING THINGS: NOT AT ALL

## 2024-10-28 NOTE — PROGRESS NOTES
Chief Complaint   Patient presents with    Neurologic Problem     Vestibular migraines, referred by Dr. Werner     History  HISTORY OF PRESENT ILLNESS  Syed Alberto is a 34 y.o. female who came in for neurological consultation requested by her ENT specialist, Dr. Werner.  She has been experiencing dizziness associated with headaches since 2021.  She would suddenly start to feel unsteady along with ringing sounds in her ears and this would usually be followed by a pressure-like sensation in her head.  She has had an extensive workup from ENT standpoint and it was negative.  Initially it was suspected to be Ménière's disease but not anymore.  Brain and IAC MRI done recently was negative.   She does have a history of classic migraines where she would get visual obscurations in the peripheral visual field and this would usually be followed by a sharp retro-orbital pain associated with nausea.  Fortunately, she does not get those anymore  There was a suspicion that she may be experiencing vestibular migraines and was tried on rizatriptan and sumatriptan.  These gave her side effects including muscle spasms and neck stiffness and did not provide relief.   She was tried on nortriptyline which did not help either and caused weight gain.  Topiramate made some difference but she lost way too much weight.     She was also prescribed Ubrelvy but she has not tried that as yet.  Mother has migraines  Currently works as a  and mostly sits at a desk.  She has lost 120 pounds over the last year.       Past Medical History:   Diagnosis Date    Anxiety     Encounter to establish care 06/20/2024    GERD (gastroesophageal reflux disease)     Keratoconus     Obesity     Vestibular migraine      Current Outpatient Medications   Medication Sig    Melatonin 1 MG CHEW     sucralfate (CARAFATE) 1 GM tablet Take 1 tablet by mouth 2 times daily    tranexamic acid (LYSTEDA) 650 MG TABS tablet Take 2 tablets 3 times a day by oral

## 2024-10-28 NOTE — PROGRESS NOTES
Chief Complaint   Patient presents with    Neurologic Problem     Vestibular migraines, referred by Dr. Werner     Vitals:    10/28/24 0910   BP: 118/74   Pulse: 92   SpO2: 99%

## 2024-10-28 NOTE — PROGRESS NOTES
Patient declined the Aimovig 140mg/ml samples administration, stated \" I am not comfortable doing that today, especially since I will be traveling next week, in case I have any issues. I am going to try the Ubrelvy, and let you all know if I decide to reconsider the shot.\"  Unused Aimovig sample discarded.

## 2025-01-13 ENCOUNTER — TELEPHONE (OUTPATIENT)
Age: 35
End: 2025-01-13